# Patient Record
Sex: FEMALE | Race: WHITE | URBAN - METROPOLITAN AREA
[De-identification: names, ages, dates, MRNs, and addresses within clinical notes are randomized per-mention and may not be internally consistent; named-entity substitution may affect disease eponyms.]

---

## 2019-10-18 ENCOUNTER — IMPORTED ENCOUNTER (OUTPATIENT)
Dept: URBAN - METROPOLITAN AREA CLINIC 50 | Facility: CLINIC | Age: 79
End: 2019-10-18

## 2019-11-22 ENCOUNTER — IMPORTED ENCOUNTER (OUTPATIENT)
Dept: URBAN - METROPOLITAN AREA CLINIC 50 | Facility: CLINIC | Age: 79
End: 2019-11-22

## 2019-12-06 ENCOUNTER — IMPORTED ENCOUNTER (OUTPATIENT)
Dept: URBAN - METROPOLITAN AREA CLINIC 50 | Facility: CLINIC | Age: 79
End: 2019-12-06

## 2019-12-19 ENCOUNTER — IMPORTED ENCOUNTER (OUTPATIENT)
Dept: URBAN - METROPOLITAN AREA CLINIC 50 | Facility: CLINIC | Age: 79
End: 2019-12-19

## 2019-12-19 NOTE — PATIENT DISCUSSION
"""Will order another B & L OD lens in current strength. Will order +1.75 & +2.00 for OS hi add.  Patient to call progress or keep 1/17/19 follow up. """

## 2020-01-23 ENCOUNTER — IMPORTED ENCOUNTER (OUTPATIENT)
Dept: URBAN - METROPOLITAN AREA CLINIC 50 | Facility: CLINIC | Age: 80
End: 2020-01-23

## 2020-01-23 NOTE — PATIENT DISCUSSION
"""Patient advised to use AT more often BID-QID when wearing CL. Patient is also aware that MF CL are no longer a option and that this is her best corrected VA until after Cat sx.  Trials today

## 2020-02-03 ENCOUNTER — IMPORTED ENCOUNTER (OUTPATIENT)
Dept: URBAN - METROPOLITAN AREA CLINIC 50 | Facility: CLINIC | Age: 80
End: 2020-02-03

## 2020-03-13 ENCOUNTER — IMPORTED ENCOUNTER (OUTPATIENT)
Dept: URBAN - METROPOLITAN AREA CLINIC 50 | Facility: CLINIC | Age: 80
End: 2020-03-13

## 2021-04-17 ASSESSMENT — VISUAL ACUITY
OS_CC: 20/100
OD_BAT: >20/400
OD_SC: 20/80
OD_CC: 20/50+
OS_BAT: >20/400
OS_CC: J4
OS_OTHER: >20/400.
OD_OTHER: >20/400.
OS_SC: 20/60
OD_CC: J4

## 2021-04-17 ASSESSMENT — TONOMETRY
OD_IOP_MMHG: 18
OS_IOP_MMHG: 20

## 2021-10-04 NOTE — PATIENT DISCUSSION
Visually significant PCO present on exam today. Cannot improve vision with refraction/glasses at this time. Discussed treatment options including observation versus Yag capsulotomy. Laser recommended to improve vision. We discussed the risks/benefits of laser capsulotomy. All questions were answered. Patient elects to proceed. Schedule Yag capsulotomy OU.

## 2021-10-04 NOTE — PATIENT DISCUSSION
Patient understands that her vision may still be limited after the Yag procedure due to the poor view to the retina.